# Patient Record
Sex: MALE | Race: WHITE | NOT HISPANIC OR LATINO | URBAN - METROPOLITAN AREA
[De-identification: names, ages, dates, MRNs, and addresses within clinical notes are randomized per-mention and may not be internally consistent; named-entity substitution may affect disease eponyms.]

---

## 2017-11-21 ENCOUNTER — APPOINTMENT (RX ONLY)
Dept: URBAN - METROPOLITAN AREA CLINIC 291 | Facility: CLINIC | Age: 67
Setting detail: DERMATOLOGY
End: 2017-11-21

## 2017-11-21 DIAGNOSIS — L82.0 INFLAMED SEBORRHEIC KERATOSIS: ICD-10-CM

## 2017-11-21 DIAGNOSIS — D22 MELANOCYTIC NEVI: ICD-10-CM

## 2017-11-21 DIAGNOSIS — L81.4 OTHER MELANIN HYPERPIGMENTATION: ICD-10-CM

## 2017-11-21 DIAGNOSIS — L82.1 OTHER SEBORRHEIC KERATOSIS: ICD-10-CM

## 2017-11-21 PROBLEM — D22.5 MELANOCYTIC NEVI OF TRUNK: Status: ACTIVE | Noted: 2017-11-21

## 2017-11-21 PROCEDURE — ? LIQUID NITROGEN

## 2017-11-21 PROCEDURE — 99203 OFFICE O/P NEW LOW 30 MIN: CPT | Mod: 25

## 2017-11-21 PROCEDURE — 17110 DESTRUCTION B9 LES UP TO 14: CPT

## 2017-11-21 PROCEDURE — ? COUNSELING

## 2017-11-21 ASSESSMENT — LOCATION DETAILED DESCRIPTION DERM
LOCATION DETAILED: LEFT CENTRAL FRONTAL SCALP
LOCATION DETAILED: LEFT SUPERIOR MEDIAL UPPER BACK
LOCATION DETAILED: LEFT MID-UPPER BACK
LOCATION DETAILED: RIGHT SUPERIOR UPPER BACK

## 2017-11-21 ASSESSMENT — LOCATION SIMPLE DESCRIPTION DERM
LOCATION SIMPLE: RIGHT UPPER BACK
LOCATION SIMPLE: LEFT UPPER BACK
LOCATION SIMPLE: SCALP

## 2017-11-21 ASSESSMENT — LOCATION ZONE DERM
LOCATION ZONE: SCALP
LOCATION ZONE: TRUNK

## 2017-11-21 NOTE — PROCEDURE: LIQUID NITROGEN
Medical Necessity Information: It is in your best interest to select a reason for this procedure from the list below. All of these items fulfill various CMS LCD requirements except the new and changing color options.
Consent: The patient's consent was obtained including but not limited to risks of crusting, scabbing, blistering, scarring, darker or lighter pigmentary change, recurrence, incomplete removal and infection.
Medical Necessity Clause: This procedure was medically necessary because the lesions that were treated were: growing irritated
Detail Level: Simple
Render Post-Care Instructions In Note?: no
Post-Care Instructions: I reviewed with the patient in detail post-care instructions. Patient is to wear sunprotection, and avoid picking at any of the treated lesions. Pt may apply Vaseline to crusted or scabbing areas.

## 2018-04-20 ENCOUNTER — HOSPITAL ENCOUNTER (OUTPATIENT)
Dept: HOSPITAL 80 - FIMAGING | Age: 68
End: 2018-04-20
Attending: SPECIALIST
Payer: COMMERCIAL

## 2018-04-20 DIAGNOSIS — R93.7: ICD-10-CM

## 2018-04-20 DIAGNOSIS — C61: Primary | ICD-10-CM

## 2018-04-20 PROCEDURE — A9503 TC99M MEDRONATE: HCPCS

## 2018-04-20 PROCEDURE — 78306 BONE IMAGING WHOLE BODY: CPT

## 2018-06-06 NOTE — GHP
[f rep st]



                                                       PREOP HISTORY AND PHYSICAL





ADMISSION DIAGNOSIS:  Adenocarcinoma of the prostate.



HISTORY OF PRESENT ILLNESS:  This is a gentleman who presented with an elevated PSA of 8 and had a bi
opsy of the prostate that revealed a Shira score 7 cancer.  His prostate volume was 75 g.  He had a
n MRI of the prostate that confirmed what appeared to be localized prostate cancer with some degenera
tive joint disease.  He is a Shira 4+3; eight cores positive World Health Organization group 3 with
 greater than 50% of the cores positive.  He is admitted for bilateral pelvic lymphadenectomy and rad
ical retropubic prostatectomy robotically.



PAST MEDICAL HISTORY:  BPH with obstruction, elevated PSA.  He denies any previous surgical treatment
s.



MEDICATIONS:  Include ibuprofen.  He has had a bowel prep preoperatively.



ALLERGIES:  None.



FAMILY HISTORY:  Noncontributory.



SOCIAL HISTORY:  He has moderate alcohol consumption.  Light tobacco use.  He is  and retired.




REVIEW OF SYSTEMS:  Negative cardiac, respiratory, GI, and endocrine.



PHYSICAL EXAMINATION:  VITAL SIGNS:  Stable.  CHEST:  Clear.  HEART:  Regular rate and rhythm.  ABDOM
EN:  Normal.  No organomegaly, rebound or guarding.  LOWER EXTREMITIES:  Normal.  At the present time
, he is admitted for the above procedure.  Indications, complications, risks as well as options have 
been discussed.  



Written and verbal consents have been obtained, and he is admitted for the above procedure.





Job #:  [id number]

## 2018-06-07 ENCOUNTER — HOSPITAL ENCOUNTER (INPATIENT)
Dept: HOSPITAL 80 - F1N | Age: 68
LOS: 2 days | Discharge: HOME | DRG: 708 | End: 2018-06-09
Attending: SPECIALIST | Admitting: SPECIALIST
Payer: COMMERCIAL

## 2018-06-07 DIAGNOSIS — C61: Primary | ICD-10-CM

## 2018-06-07 PROCEDURE — 07BC4ZX EXCISION OF PELVIS LYMPHATIC, PERCUTANEOUS ENDOSCOPIC APPROACH, DIAGNOSTIC: ICD-10-PCS | Performed by: SPECIALIST

## 2018-06-07 PROCEDURE — 0VT04ZZ RESECTION OF PROSTATE, PERCUTANEOUS ENDOSCOPIC APPROACH: ICD-10-PCS | Performed by: SPECIALIST

## 2018-06-07 RX ADMIN — HYDROCODONE BITARTRATE AND ACETAMINOPHEN PRN TAB: 5; 325 TABLET ORAL at 12:56

## 2018-06-07 RX ADMIN — DEXTROSE MONOHYDRATE, SODIUM CHLORIDE, AND POTASSIUM CHLORIDE SCH MLS: 50; 4.5; 1.49 INJECTION, SOLUTION INTRAVENOUS at 21:34

## 2018-06-07 RX ADMIN — LOSARTAN POTASSIUM SCH MG: 50 TABLET, FILM COATED ORAL at 21:34

## 2018-06-07 RX ADMIN — DEXTROSE MONOHYDRATE, SODIUM CHLORIDE, AND POTASSIUM CHLORIDE SCH MLS: 50; 4.5; 1.49 INJECTION, SOLUTION INTRAVENOUS at 12:57

## 2018-06-07 NOTE — GOP
[f rep st]



                                                                OPERATIVE REPORT





DATE OF OPERATION:  06/07/2018



SURGEON:  Maximino Diego MD



ASSISTANT:  Mariposa Mccabe CFA.



ANESTHESIA:  General.



ANESTHESIOLOGIST:  Ari Morales MD.



PREOPERATIVE DIAGNOSIS:  Adenocarcinoma of the prostate.



POSTOPERATIVE DIAGNOSIS:  Adenocarcinoma of the prostate.



PROCEDURE PERFORMED:  Robotic-assisted radical prostatectomy and pelvic lymph node dissection.



FINDINGS:  





ESTIMATED BLOOD LOSS:  per Anesthesia was 100 mL.



INDICATIONS:  Prostate cancer discussed in H and P.  Written verbal consent was obtained.  I tried to
 answer all of his questions and his wife's questions preoperatively and written verbal consent was o
btained.



DESCRIPTION OF PROCEDURE:  After undergoing general anesthesia and being appropriately prepped and dr
aped sterilely in the robot position and all sites padded and the appropriate time-out.  He had a Fol
ey catheter passed in the bladder and at that point, a Veress needle was placed in the supraumbilical
 site and intraabdominal pressure was inflated to 15 mmHg pressure with CO2 and camera port 12 mm was
 placed under direct vision and then 3, 8 mm robot arm ports, and an assistant 12 mm port were placed
 strategically.  At that point, the robot was docked and I began the procedure by taking down the sig
moid colon which had adhesions.  Then, I was able to bring the colon out of the pelvis to identify th
e Vasa as it went into the retroperitoneal space near the prostate, dissected those down and mobilize
d the rectum off the posterior prostate and entered Denonvilliers' space.  At that point, dissected a
mpulla of the vas deferens at the right.  The ampulla was quite free, the left seemed to be fixed, so
 I tried to go quite lateral to that, so as not to leave any inflammatory tissue or potential maligna
ncy behind and then Hem-O-zaina's were used for hemostasis.  At that point dropped the bladder down in 
a normal fashion.  The obliterated umbilical arteries and urachus were taken down.  Hemostasis provid
ed with electrocautery.  The endopelvic fascia was incised on the right and left sides of the prostat
e.  Deep dorsal vein was ligated with two #0 Vicryl sutures with an M stitch technique and then at th
at point, focused at the bladder neck and that was taken down with electrocautery and sharply.  I cou
ld delineate the plane between the bladder neck and the base of the prostate quite well and made sure
 not to buttonhole the bladder on the lateral sides and at that point, I brought the ampulla vas defe
rens anterior to the bladder neck and then with the vessel tissue sealer device dissected out on the 
left side to provide hemostasis and try to get clear of the margins of what appeared to be relatively
 large cancer on the left side.  Then at that point on the right side stayed adjacent to the prostate
 and down to the apex, and the apex was clear.  He had apical biopsy that was positive on the right, 
but clinically it appeared that there was no malignancy remaining.  Then at that point the apex of th
e prostate was transected and the urethra was transected, and then inspected the pelvis.  There was n
o bleeding identified of significance.  No rectal injury identified and then a Merrick stitch with a 3-
0 V-lock was used to bring the bladder back down to the pelvis.  Then the anastomosis was completed w
ith a 4-0 running Vicryl stitch.  It was tested and noted to be water tight with an 18 Flynn catheter
, balloon inflated, and then the pelvic lymph nodes were taken down using the mid aspect of the exter
nal iliac vein to its bifurcation.  The depth of the dissection was carried down to the obturator ner
ve on both the right and left sides.  Hemostasis provided with Hem-O-Locks.  Then the specimens were 
removed.  The prostate was placed in a bag and the OMARI drain was brought out through the left lateral 
robot 8 mm port. Fascial suture device was used to close the assistant port and then extended the inc
ision for the camera port to bring the prostate out and then hemostasis noted at all the port sites. 
 Then, the linea alba and rectus was approximated with a running 0 Vicryl and 4-0 Monocryl was used t
o close the skin edges.  Estimated blood loss 100 mL.  Lymph nodes, there was no obvious large fidel 
disease on inspection during the operative procedure and grossly the prostate appeared to have no res
idual or no tumor extending beyond the dissection margins, but the left was firm and large.  Catheter
 irrigated well.  He will be admitted postoperatively.  No complications.  Specimens identified.  I w
ill discuss the findings with his family.



Copy requested to:

Elliot Napier MD



Job #:  [id number]

## 2018-06-07 NOTE — POSTOPPROG
Post Op Note


Date of Operation: 06/07/18


Surgeon: Maximino Diego


Assistant: Estelle


Anesthesiologist: Warm


Anesthesia: GET(General Endotracheal)


Pre-op Diagnosis: prostate cancer


Procedure: RA-RRP and PLND


Inf/Abcess present in the surg proc area at time of surgery?: No


EBL: 


Complications: 





none


Drains: Duane Ng


Specimen(s): 





prostate, lymph nodes--dictated

## 2018-06-07 NOTE — PDMN
Medical Necessity


Medical necessity: Patient meets inpatient status per physician note and [facility] [place]

960 Prostatectomy, Radical - 1 day postop - (robotic-assisted radical 

prostatectomy with PLND.)

## 2018-06-07 NOTE — PDANEPAE
ANE History of Present Illness





67 year old with prostate ca





ANE Past Medical History





- Cardiovascular History


Hx Hypertension: Yes


Hx Arrhythmias: No


Hx Chest Pain: No


Hx Coronary Artery / Peripheral Vascular Disease: No


Hx CHF / Valvular Disease: No


Hx Palpitations: No





- Pulmonary History


Hx COPD: No


Hx Asthma/Reactive Airway Disease: No


Hx Recent Upper Respiratory Infection: No


Hx Oxygen in Use at Home: No


Hx Sleep Apnea: No


Sleep Apnea Screening Result - Last Documented: Negative





- Neurologic History


Hx Cerebrovascular Accident: No


Hx Seizures: No


Hx Dementia: No





- Endocrine History


Hx Diabetes: No





- Renal History


Hx Renal Disorders: No





- Liver History


Hx Hepatic Disorders: No





- Neurological & Psychiatric Hx


Hx Neurological and Psychiatric Disorders: No





- Cancer History


Hx Cancer: No


Cancer History Comment: SKIN CA REMOVED X2





- Congenital Disorder History


Hx Congenital Disorders: No





- GI History


Hx Gastrointestinal Disorders: No





- Other Health History


Other Health History: NEG





- Chronic Pain History


Chronic Pain: Yes (KNEES)





- Surgical History


Prior Surgeries: NONE





ANE Review of Systems


Review of systems is: negative


Review of Systems: 








- Exercise capacity


METS (RN): 5 METS





ANE Patient History





- Allergies


Allergies/Adverse Reactions: 








No Known Allergies Allergy (Unverified 05/31/18 14:53)


 








- Home Medications


Home medications: home medication list seen and reviewed


Home Medications: 








Cholecalciferol Vit D3 [Vitamin D3 (*)] 50,000 unit PO TH 05/31/18 [Last Taken 

Unknown]


Losartan Potassium [Cozaar 50 mg (*)] 50 mg PO HS 05/31/18 [Last Taken Unknown]








- NPO status


NPO Status: no food or drink >8 hours


NPO Since - Liquids (Date): 06/07/18


NPO Since - Liquids (Time): 03:30


NPO Since - Solids (Date): 06/05/18


NPO Since - Solids (Time): 19:00





- Smoking Hx


Smoking Status: Current some day smoker





- Alcohol Use


Alcohol Use: Other (2-3 drinks per drinks)





- Family Anes Hx


Family Anes Hx: none


Family Hx Anesthesia Complications: NEG





ANE Labs/Vital Signs





- Vital Signs


Blood Pressure: 135/89


Heart Rate: 66


Respiratory Rate: 14


O2 Sat (%): 94


Height: 180.34 cm


Weight: 97.522 kg





ANE Physical Exam





- Airway


Neck exam: FROM


Mallampati Score: Class 1


Mouth exam: normal dental/mouth exam





- Pulmonary


Pulmonary: no respiratory distress, clear to auscultation





- Cardiovascular


Cardiovascular: regular rate and rhythym





- ASA Status


ASA Status: II





ANE Anesthesia Plan


Anesthesia Plan: general endotracheal anesthesia

## 2018-06-08 LAB — PLATELET # BLD: 229 10^3/UL (ref 150–400)

## 2018-06-08 RX ADMIN — LOSARTAN POTASSIUM SCH MG: 50 TABLET, FILM COATED ORAL at 20:19

## 2018-06-08 RX ADMIN — DEXTROSE MONOHYDRATE, SODIUM CHLORIDE, AND POTASSIUM CHLORIDE SCH MLS: 50; 4.5; 1.49 INJECTION, SOLUTION INTRAVENOUS at 16:23

## 2018-06-08 RX ADMIN — HYDROCODONE BITARTRATE AND ACETAMINOPHEN PRN TAB: 5; 325 TABLET ORAL at 20:18

## 2018-06-08 RX ADMIN — DEXTROSE MONOHYDRATE, SODIUM CHLORIDE, AND POTASSIUM CHLORIDE SCH MLS: 50; 4.5; 1.49 INJECTION, SOLUTION INTRAVENOUS at 06:02

## 2018-06-08 RX ADMIN — HYDROCODONE BITARTRATE AND ACETAMINOPHEN PRN TAB: 5; 325 TABLET ORAL at 04:08

## 2018-06-08 RX ADMIN — HYDROCODONE BITARTRATE AND ACETAMINOPHEN PRN TAB: 5; 325 TABLET ORAL at 14:27

## 2018-06-08 NOTE — SOAPPROG
SOAP Progress Note


Assessment/Plan: 


Assessment:  Prostate cancer   Acute  POD 1, cont care








Plan: Plan DC in AM





06/08/18 09:52





Subjective: 





doing well


Objective: 





 Vital Signs











Temp Pulse Resp BP Pulse Ox


 


 36.8 C   62   18   112/63   97 


 


 06/08/18 08:21  06/08/18 08:21  06/08/18 08:21  06/08/18 08:21  06/08/18 08:21








 Laboratory Results





 06/08/18 04:11 





 06/08/18 04:11 





 











 06/07/18 06/08/18 06/09/18





 05:59 05:59 05:59


 


Intake Total  710 1100


 


Output Total  1325 


 


Balance  -615 1100














Physical Exam





- Physical Exam


General Appearance: alert


EENT: normal ENT inspection


Neck: supple


Respiratory: No respiratory distress


Cardiac/Chest: regular rate, rhythm


Abdomen: soft


Male Genitalia: other (cath ok)


Back: No CVA tenderness


Extremities: non-tender, No calf tenderness, No swelling


Neuro/Psych: oriented x 3





ICD10 Worksheet


Patient Problems: 


 Problems











Problem Status Onset


 


Prostate cancer Acute  














- ICD10 Problem Qualifiers


(1) Prostate cancer

## 2018-06-09 VITALS — SYSTOLIC BLOOD PRESSURE: 105 MMHG | DIASTOLIC BLOOD PRESSURE: 61 MMHG

## 2018-06-09 RX ADMIN — DEXTROSE MONOHYDRATE, SODIUM CHLORIDE, AND POTASSIUM CHLORIDE SCH MLS: 50; 4.5; 1.49 INJECTION, SOLUTION INTRAVENOUS at 00:27

## 2018-06-09 RX ADMIN — HYDROCODONE BITARTRATE AND ACETAMINOPHEN PRN TAB: 5; 325 TABLET ORAL at 04:18

## 2018-06-09 NOTE — ASMTCMCOM
CM Note

 

CM Note                       

Notes:

Plan is for pt to DC today with no needs.

 

Date Signed:  06/09/2018 12:11 PM

Electronically Signed By:Zeny Ayala LCSW

## 2018-06-09 NOTE — GDS
[f rep st]



                                                             DISCHARGE SUMMARY





ADMISSION DIAGNOSIS:  Adenocarcinoma of the prostate.



DISCHARGE DIAGNOSIS:  Adenocarcinoma of the prostate.



PROCEDURES DURING HOSPITALIZATION:  Robotic-assisted radical prostatectomy with bilateral pelvic lymp
hadenectomy.



CONSULTATIONS:  None.



HOSPITAL COURSE:  Gentleman, who was an a.m. admission, had the above procedure performed.  His Jacks
on-Ng drain was discontinued postop day 2.  He is discharged home with normal diet, doing well.  P
ostoperative, discussed the potential risks and complications associated in the postoperative period,
 mainly being DVT and pulmonary emboli.  Options of prevention are ambulation as well as anticoagulat
ion.  We have elected not to pursue the anticoagulation because of potential postop bleeding.  If he 
has any suggestion of leg swelling, leg pain, shortness of breath or chest pain, he is aware that he 
needs to go to the emergency room urgently.  I have tried to answer their questions to the best of my
 ability and he will be discharged home with a catheter in place.





Job #:  [id number]

## 2018-06-09 NOTE — SOAPPROG
SOAP Progress Note


Assessment/Plan: 


Assessment:  Prostate cancer   Acute  POD 2, plan DC








Plan: Plan DC 








06/09/18 12:09





Subjective: 





doing well, plan for dc home


Objective: 





 Vital Signs











Temp Pulse Resp BP Pulse Ox


 


 37 C   72   16   105/61   96 


 


 06/09/18 08:46  06/09/18 08:46  06/09/18 08:46  06/09/18 08:46  06/09/18 08:46








 Laboratory Results





 06/08/18 04:11 





 06/08/18 04:11 





 











 06/08/18 06/09/18 06/10/18





 05:59 05:59 05:59


 


Intake Total 710 2825 505


 


Output Total 1325 6658 2542


 


Balance -966 866 -5300














Physical Exam





- Physical Exam


General Appearance: alert


Neck: supple


Respiratory: No respiratory distress


Cardiac/Chest: regular rate, rhythm


Abdomen: soft


Male Genitalia: other (catheter ok)


Back: No CVA tenderness


Skin: warm/dry


Extremities: No calf tenderness, No Kami's sign


Neuro/Psych: alert, oriented x 3





ICD10 Worksheet


Patient Problems: 


 Problems











Problem Status Onset


 


Prostate cancer Acute  














- ICD10 Problem Qualifiers


(1) Prostate cancer

## 2022-03-31 ENCOUNTER — APPOINTMENT (RX ONLY)
Dept: URBAN - METROPOLITAN AREA CLINIC 307 | Facility: CLINIC | Age: 72
Setting detail: DERMATOLOGY
End: 2022-03-31

## 2022-03-31 DIAGNOSIS — L82.1 OTHER SEBORRHEIC KERATOSIS: ICD-10-CM

## 2022-03-31 DIAGNOSIS — L57.0 ACTINIC KERATOSIS: ICD-10-CM

## 2022-03-31 DIAGNOSIS — L81.4 OTHER MELANIN HYPERPIGMENTATION: ICD-10-CM | Status: STABLE

## 2022-03-31 DIAGNOSIS — D22 MELANOCYTIC NEVI: ICD-10-CM | Status: STABLE

## 2022-03-31 PROBLEM — D22.5 MELANOCYTIC NEVI OF TRUNK: Status: ACTIVE | Noted: 2022-03-31

## 2022-03-31 PROCEDURE — ? SUNSCREEN RECOMMENDATIONS

## 2022-03-31 PROCEDURE — 99203 OFFICE O/P NEW LOW 30 MIN: CPT | Mod: 25

## 2022-03-31 PROCEDURE — ? COUNSELING

## 2022-03-31 PROCEDURE — ? LIQUID NITROGEN

## 2022-03-31 PROCEDURE — 17004 DESTROY PREMAL LESIONS 15/>: CPT

## 2022-03-31 PROCEDURE — ? FULL BODY SKIN EXAM

## 2022-03-31 ASSESSMENT — LOCATION SIMPLE DESCRIPTION DERM
LOCATION SIMPLE: RIGHT SCALP
LOCATION SIMPLE: LEFT OCCIPITAL SCALP
LOCATION SIMPLE: RIGHT TEMPLE
LOCATION SIMPLE: RIGHT WRIST
LOCATION SIMPLE: LEFT HAND
LOCATION SIMPLE: RIGHT UPPER ARM
LOCATION SIMPLE: RIGHT FOREARM
LOCATION SIMPLE: RIGHT SHOULDER
LOCATION SIMPLE: POSTERIOR SCALP
LOCATION SIMPLE: LEFT UPPER BACK
LOCATION SIMPLE: RIGHT HAND
LOCATION SIMPLE: RIGHT OCCIPITAL SCALP
LOCATION SIMPLE: LEFT FOREARM
LOCATION SIMPLE: SCALP
LOCATION SIMPLE: LEFT SCALP

## 2022-03-31 ASSESSMENT — LOCATION DETAILED DESCRIPTION DERM
LOCATION DETAILED: LEFT DISTAL DORSAL FOREARM
LOCATION DETAILED: MID-OCCIPITAL SCALP
LOCATION DETAILED: LEFT MEDIAL UPPER BACK
LOCATION DETAILED: LEFT CENTRAL PARIETAL SCALP
LOCATION DETAILED: RIGHT POSTERIOR SHOULDER
LOCATION DETAILED: RIGHT SUPERIOR TEMPLE
LOCATION DETAILED: RIGHT PROXIMAL DORSAL FOREARM
LOCATION DETAILED: LEFT MEDIAL FRONTAL SCALP
LOCATION DETAILED: LEFT ULNAR DORSAL HAND
LOCATION DETAILED: RIGHT MEDIAL FRONTAL SCALP
LOCATION DETAILED: LEFT SUPERIOR PARIETAL SCALP
LOCATION DETAILED: RIGHT DISTAL DORSAL FOREARM
LOCATION DETAILED: RIGHT ULNAR DORSAL HAND
LOCATION DETAILED: LEFT SUPERIOR OCCIPITAL SCALP
LOCATION DETAILED: LEFT RADIAL DORSAL HAND
LOCATION DETAILED: RIGHT SUPERIOR OCCIPITAL SCALP
LOCATION DETAILED: RIGHT DISTAL POSTERIOR UPPER ARM
LOCATION DETAILED: RIGHT MEDIAL DORSAL WRIST
LOCATION DETAILED: RIGHT RADIAL DORSAL HAND
LOCATION DETAILED: RIGHT SUPERIOR PARIETAL SCALP
LOCATION DETAILED: RIGHT DORSAL RING METACARPOPHALANGEAL JOINT
LOCATION DETAILED: RIGHT CENTRAL PARIETAL SCALP
LOCATION DETAILED: LEFT CENTRAL FRONTAL SCALP

## 2022-03-31 ASSESSMENT — LOCATION ZONE DERM
LOCATION ZONE: ARM
LOCATION ZONE: HAND
LOCATION ZONE: FACE
LOCATION ZONE: SCALP
LOCATION ZONE: TRUNK

## 2023-06-01 ENCOUNTER — APPOINTMENT (RX ONLY)
Dept: URBAN - METROPOLITAN AREA CLINIC 292 | Facility: CLINIC | Age: 73
Setting detail: DERMATOLOGY
End: 2023-06-01

## 2023-06-01 DIAGNOSIS — L82.1 OTHER SEBORRHEIC KERATOSIS: ICD-10-CM

## 2023-06-01 DIAGNOSIS — L91.8 OTHER HYPERTROPHIC DISORDERS OF THE SKIN: ICD-10-CM

## 2023-06-01 DIAGNOSIS — D22 MELANOCYTIC NEVI: ICD-10-CM

## 2023-06-01 DIAGNOSIS — L57.8 OTHER SKIN CHANGES DUE TO CHRONIC EXPOSURE TO NONIONIZING RADIATION: ICD-10-CM

## 2023-06-01 DIAGNOSIS — D18.0 HEMANGIOMA: ICD-10-CM

## 2023-06-01 PROBLEM — D22.5 MELANOCYTIC NEVI OF TRUNK: Status: ACTIVE | Noted: 2023-06-01

## 2023-06-01 PROBLEM — D18.01 HEMANGIOMA OF SKIN AND SUBCUTANEOUS TISSUE: Status: ACTIVE | Noted: 2023-06-01

## 2023-06-01 PROCEDURE — 99213 OFFICE O/P EST LOW 20 MIN: CPT

## 2023-06-01 PROCEDURE — ? FULL BODY SKIN EXAM

## 2023-06-01 PROCEDURE — ? COUNSELING

## 2023-06-01 ASSESSMENT — LOCATION SIMPLE DESCRIPTION DERM
LOCATION SIMPLE: UPPER BACK
LOCATION SIMPLE: RIGHT FOREHEAD
LOCATION SIMPLE: CHEST
LOCATION SIMPLE: RIGHT UPPER BACK
LOCATION SIMPLE: ABDOMEN

## 2023-06-01 ASSESSMENT — LOCATION DETAILED DESCRIPTION DERM
LOCATION DETAILED: RIGHT SUPERIOR MEDIAL UPPER BACK
LOCATION DETAILED: STERNUM
LOCATION DETAILED: LEFT LATERAL ABDOMEN
LOCATION DETAILED: LEFT RIB CAGE
LOCATION DETAILED: SUPERIOR THORACIC SPINE
LOCATION DETAILED: RIGHT INFERIOR MEDIAL FOREHEAD

## 2023-06-01 ASSESSMENT — LOCATION ZONE DERM
LOCATION ZONE: TRUNK
LOCATION ZONE: FACE
LOCATION ZONE: TRUNK

## 2023-06-01 NOTE — PROCEDURE: MIPS QUALITY
Quality 431: Preventive Care And Screening: Unhealthy Alcohol Use - Screening: Patient screened for unhealthy alcohol use using a single question and scores less than 2 times per year
Detail Level: Detailed
Quality 47: Advance Care Plan: Advance Care Planning discussed and documented in the medical record; patient did not wish or was not able to name a surrogate decision maker or provide an advance care plan.
Quality 130: Documentation Of Current Medications In The Medical Record: Current Medications Documented
Quality 226: Preventive Care And Screening: Tobacco Use: Screening And Cessation Intervention: Patient screened for tobacco use and is an ex/non-smoker
Quality 431: Preventive Care And Screening: Unhealthy Alcohol Use - Screening: Patient not identified as an unhealthy alcohol user when screened for unhealthy alcohol use using a systematic screening method

## 2024-05-29 ENCOUNTER — APPOINTMENT (RX ONLY)
Dept: URBAN - METROPOLITAN AREA CLINIC 307 | Facility: CLINIC | Age: 74
Setting detail: DERMATOLOGY
End: 2024-05-29

## 2024-05-29 DIAGNOSIS — L82.1 OTHER SEBORRHEIC KERATOSIS: ICD-10-CM

## 2024-05-29 DIAGNOSIS — D22 MELANOCYTIC NEVI: ICD-10-CM

## 2024-05-29 DIAGNOSIS — L82.0 INFLAMED SEBORRHEIC KERATOSIS: ICD-10-CM

## 2024-05-29 DIAGNOSIS — D18.0 HEMANGIOMA: ICD-10-CM

## 2024-05-29 DIAGNOSIS — L81.4 OTHER MELANIN HYPERPIGMENTATION: ICD-10-CM

## 2024-05-29 PROBLEM — D22.5 MELANOCYTIC NEVI OF TRUNK: Status: ACTIVE | Noted: 2024-05-29

## 2024-05-29 PROBLEM — D18.01 HEMANGIOMA OF SKIN AND SUBCUTANEOUS TISSUE: Status: ACTIVE | Noted: 2024-05-29

## 2024-05-29 PROCEDURE — ? LIQUID NITROGEN

## 2024-05-29 PROCEDURE — ? FULL BODY SKIN EXAM

## 2024-05-29 PROCEDURE — ? TREATMENT REGIMEN

## 2024-05-29 PROCEDURE — 17110 DESTRUCTION B9 LES UP TO 14: CPT

## 2024-05-29 PROCEDURE — 99213 OFFICE O/P EST LOW 20 MIN: CPT | Mod: 25

## 2024-05-29 PROCEDURE — ? COUNSELING

## 2024-05-29 ASSESSMENT — LOCATION DETAILED DESCRIPTION DERM
LOCATION DETAILED: INFERIOR THORACIC SPINE
LOCATION DETAILED: LEFT MEDIAL MALAR CHEEK
LOCATION DETAILED: RIGHT SUPERIOR MEDIAL UPPER BACK
LOCATION DETAILED: LEFT MEDIAL UPPER BACK
LOCATION DETAILED: LEFT CLAVICULAR SKIN
LOCATION DETAILED: RIGHT LATERAL INFERIOR CHEST
LOCATION DETAILED: RIGHT MEDIAL UPPER BACK

## 2024-05-29 ASSESSMENT — LOCATION SIMPLE DESCRIPTION DERM
LOCATION SIMPLE: CHEST
LOCATION SIMPLE: LEFT CLAVICULAR SKIN
LOCATION SIMPLE: LEFT UPPER BACK
LOCATION SIMPLE: UPPER BACK
LOCATION SIMPLE: LEFT CHEEK
LOCATION SIMPLE: RIGHT UPPER BACK

## 2024-05-29 ASSESSMENT — LOCATION ZONE DERM
LOCATION ZONE: FACE
LOCATION ZONE: TRUNK

## 2024-05-29 NOTE — PROCEDURE: LIQUID NITROGEN
Show Topical Anesthesia Variable?: Yes
Spray Paint Text: The liquid nitrogen was applied to the skin utilizing a spray paint frosting technique.
Spray Paint Technique: No
Consent: The patient's consent was obtained including but not limited to risks of crusting, scabbing, blistering, scarring, darker or lighter pigmentary change, recurrence, incomplete removal and infection.
Medical Necessity Clause: This procedure was medically necessary because the lesions that were treated were:
Detail Level: Detailed
Post-Care Instructions: I reviewed with the patient in detail post-care instructions. Patient is to wear sunprotection, and avoid picking at any of the treated lesions. Pt may apply Vaseline to crusted or scabbing areas.
Medical Necessity Information: It is in your best interest to select a reason for this procedure from the list below. All of these items fulfill various CMS LCD requirements except the new and changing color options.
Number Of Freeze-Thaw Cycles: 1 freeze-thaw cycle

## 2025-05-15 ENCOUNTER — APPOINTMENT (OUTPATIENT)
Dept: URBAN - METROPOLITAN AREA CLINIC 307 | Facility: CLINIC | Age: 75
Setting detail: DERMATOLOGY
End: 2025-05-15

## 2025-05-15 DIAGNOSIS — D18.0 HEMANGIOMA: ICD-10-CM | Status: STABLE

## 2025-05-15 DIAGNOSIS — D22 MELANOCYTIC NEVI: ICD-10-CM | Status: STABLE

## 2025-05-15 DIAGNOSIS — L81.4 OTHER MELANIN HYPERPIGMENTATION: ICD-10-CM | Status: STABLE

## 2025-05-15 DIAGNOSIS — L82.1 OTHER SEBORRHEIC KERATOSIS: ICD-10-CM | Status: STABLE

## 2025-05-15 DIAGNOSIS — L57.0 ACTINIC KERATOSIS: ICD-10-CM | Status: INADEQUATELY CONTROLLED

## 2025-05-15 PROBLEM — D22.5 MELANOCYTIC NEVI OF TRUNK: Status: ACTIVE | Noted: 2025-05-15

## 2025-05-15 PROBLEM — D18.01 HEMANGIOMA OF SKIN AND SUBCUTANEOUS TISSUE: Status: ACTIVE | Noted: 2025-05-15

## 2025-05-15 PROCEDURE — ? COUNSELING

## 2025-05-15 PROCEDURE — ? TREATMENT REGIMEN

## 2025-05-15 PROCEDURE — 17003 DESTRUCT PREMALG LES 2-14: CPT

## 2025-05-15 PROCEDURE — ? FULL BODY SKIN EXAM

## 2025-05-15 PROCEDURE — 17000 DESTRUCT PREMALG LESION: CPT

## 2025-05-15 PROCEDURE — ? LIQUID NITROGEN

## 2025-05-15 PROCEDURE — 99213 OFFICE O/P EST LOW 20 MIN: CPT | Mod: 25

## 2025-05-15 ASSESSMENT — LOCATION DETAILED DESCRIPTION DERM
LOCATION DETAILED: RIGHT MID-UPPER BACK
LOCATION DETAILED: RIGHT CENTRAL FRONTAL SCALP
LOCATION DETAILED: LEFT DISTAL DORSAL FOREARM
LOCATION DETAILED: MIDDLE STERNUM
LOCATION DETAILED: LEFT RADIAL DORSAL HAND
LOCATION DETAILED: RIGHT MEDIAL SUPERIOR CHEST
LOCATION DETAILED: RIGHT SUPERIOR UPPER BACK
LOCATION DETAILED: RIGHT DISTAL DORSAL FOREARM
LOCATION DETAILED: LEFT SUPERIOR UPPER BACK
LOCATION DETAILED: UPPER STERNUM
LOCATION DETAILED: PERIUMBILICAL SKIN
LOCATION DETAILED: RIGHT DORSAL WRIST
LOCATION DETAILED: LEFT PROXIMAL DORSAL FOREARM
LOCATION DETAILED: RIGHT DISTAL POSTERIOR UPPER ARM

## 2025-05-15 ASSESSMENT — LOCATION SIMPLE DESCRIPTION DERM
LOCATION SIMPLE: LEFT FOREARM
LOCATION SIMPLE: RIGHT WRIST
LOCATION SIMPLE: RIGHT SCALP
LOCATION SIMPLE: RIGHT UPPER BACK
LOCATION SIMPLE: CHEST
LOCATION SIMPLE: LEFT HAND
LOCATION SIMPLE: LEFT UPPER BACK
LOCATION SIMPLE: ABDOMEN
LOCATION SIMPLE: RIGHT FOREARM
LOCATION SIMPLE: RIGHT UPPER ARM

## 2025-05-15 ASSESSMENT — LOCATION ZONE DERM
LOCATION ZONE: HAND
LOCATION ZONE: TRUNK
LOCATION ZONE: SCALP
LOCATION ZONE: ARM

## 2025-05-15 NOTE — PROCEDURE: LIQUID NITROGEN
Render Post-Care Instructions In Note?: no
Detail Level: Detailed
Post-Care Instructions: I reviewed with the patient in detail post-care instructions. Patient is to wear sunprotection, and avoid picking at any of the treated lesions. Pt may apply Vaseline to crusted or scabbing areas.
Duration Of Freeze Thaw-Cycle (Seconds): 0
Show Applicator Variable?: Yes
Consent: The patient's consent was obtained including but not limited to risks of crusting, scabbing, blistering, scarring, darker or lighter pigmentary change, recurrence, incomplete removal and infection.
27-Oct-2022 20:27